# Patient Record
Sex: FEMALE | Race: BLACK OR AFRICAN AMERICAN
[De-identification: names, ages, dates, MRNs, and addresses within clinical notes are randomized per-mention and may not be internally consistent; named-entity substitution may affect disease eponyms.]

---

## 2021-11-24 VITALS — DIASTOLIC BLOOD PRESSURE: 67 MMHG | SYSTOLIC BLOOD PRESSURE: 150 MMHG

## 2021-12-16 ENCOUNTER — HOSPITAL ENCOUNTER (OUTPATIENT)
Dept: HOSPITAL 61 - KCIC CT | Age: 61
End: 2021-12-16
Attending: NURSE PRACTITIONER
Payer: COMMERCIAL

## 2021-12-16 DIAGNOSIS — J84.10: Primary | ICD-10-CM

## 2021-12-16 DIAGNOSIS — K76.89: ICD-10-CM

## 2021-12-16 DIAGNOSIS — D73.89: ICD-10-CM

## 2021-12-16 DIAGNOSIS — N20.0: ICD-10-CM

## 2021-12-16 DIAGNOSIS — K37: ICD-10-CM

## 2021-12-16 DIAGNOSIS — Z98.890: ICD-10-CM

## 2021-12-16 PROCEDURE — 74177 CT ABD & PELVIS W/CONTRAST: CPT

## 2021-12-16 RX ADMIN — IOHEXOL ONE ML: 240 INJECTION, SOLUTION INTRATHECAL; INTRAVASCULAR; INTRAVENOUS; ORAL at 14:23

## 2021-12-16 RX ADMIN — IOHEXOL ONE ML: 300 INJECTION, SOLUTION INTRAVENOUS at 14:23

## 2021-12-16 NOTE — [facility]
CT scan of the abdomen and pelvis with contrast  12/16/2021



CLINICAL HISTORY: Abdominal pain. Appendectomy on November 19 of this year.



TECHNIQUE: After the oral administration contrast and the intravenous administration of 100 cc of Omn
ipaque 300, contiguous, 5 mm axial sections were obtained through the abdomen and pelvis.



One or more of the following individualized dose reduction techniques were utilized for this study:



1. Automated exposure control.

2. Adjustment of the mA and/or kV according to patient size.

3. Use of iterative reconstruction technique.



FINDINGS: Comparison study is dated 11/19/2021.



Images through the lung bases demonstrate minimal dependent subsegmental atelectasis bilaterally. A 1
 cm calcified granuloma is again seen involving the left lower lobe.



The liver parenchyma has a decreased attenuation consistent with fatty infiltration. Calcified granul
omas are seen scattered throughout the spleen. The pancreas, adrenal glands and right kidney are with
in normal limits. Nonobstructing calculi are seen involving the left kidney which measure 1 to 2 mm i
n size.



The abdominal aorta tapers normally. The gallbladder is well-distended. No free fluid or free air is 
seen within the abdomen. There is no evidence of bowel obstruction. Surgical changes are seen consist
ent with an appendectomy. No abnormal fluid collection is seen to suggest evidence of an abscess. Air
 and stool are seen throughout the colon.



Images through the pelvis demonstrate the urinary bladder distended with urine. Calcifications are se
en within the pelvis consistent with phleboliths. The patient appears to be post hysterectomy. No adn
exal mass is seen. No free fluid is noted. The osseous structures are unchanged.



IMPRESSION: Post appendectomy. No acute abnormality is seen.



Electronically signed by: Bird Gambino MD (12/16/2021 2:40 PM) [id number]

## 2022-01-13 ENCOUNTER — HOSPITAL ENCOUNTER (OUTPATIENT)
Dept: HOSPITAL 61 - SURG | Age: 62
Discharge: HOME | End: 2022-01-13
Payer: COMMERCIAL

## 2022-01-13 VITALS
SYSTOLIC BLOOD PRESSURE: 145 MMHG | DIASTOLIC BLOOD PRESSURE: 74 MMHG | SYSTOLIC BLOOD PRESSURE: 145 MMHG | DIASTOLIC BLOOD PRESSURE: 74 MMHG

## 2022-01-13 VITALS — BODY MASS INDEX: 48.82 KG/M2 | HEIGHT: 65 IN | WEIGHT: 293 LBS

## 2022-01-13 VITALS — SYSTOLIC BLOOD PRESSURE: 173 MMHG | DIASTOLIC BLOOD PRESSURE: 93 MMHG

## 2022-01-13 DIAGNOSIS — I10: ICD-10-CM

## 2022-01-13 DIAGNOSIS — K21.00: ICD-10-CM

## 2022-01-13 DIAGNOSIS — Z88.8: ICD-10-CM

## 2022-01-13 DIAGNOSIS — K31.89: ICD-10-CM

## 2022-01-13 DIAGNOSIS — G47.30: ICD-10-CM

## 2022-01-13 DIAGNOSIS — F32.9: ICD-10-CM

## 2022-01-13 DIAGNOSIS — K59.00: Primary | ICD-10-CM

## 2022-01-13 DIAGNOSIS — R12: ICD-10-CM

## 2022-01-13 DIAGNOSIS — K64.0: ICD-10-CM

## 2022-01-13 DIAGNOSIS — K44.9: ICD-10-CM

## 2022-01-13 DIAGNOSIS — Z98.890: ICD-10-CM

## 2022-01-13 DIAGNOSIS — K63.89: ICD-10-CM

## 2022-01-13 DIAGNOSIS — Z79.899: ICD-10-CM

## 2022-01-13 DIAGNOSIS — M19.90: ICD-10-CM

## 2022-01-13 DIAGNOSIS — E66.3: ICD-10-CM

## 2022-01-13 PROCEDURE — 45378 DIAGNOSTIC COLONOSCOPY: CPT

## 2022-01-13 PROCEDURE — 43235 EGD DIAGNOSTIC BRUSH WASH: CPT

## 2022-01-13 NOTE — PDOC4
PROCEDURE


Procedure


EGD/colonoscopy





Indication: Heartburn, r/o Hernandes's/screening.





Meds: per anesthesia.





Findings:





E--healed reflux at 40cm, baseline grade indeterminate.


G--Normal


D--Normal.





BINA-normal.





--'Scope advanced to cecum.  Prep adequate.  Mucosa normal.  No diverticulosis, 

polyps, masses, etc.  Small IH's on retroflex.





Gray. well.





IMP: Healed GERD


        No Hernandes's.


        Internal hemorrhoids.





REC: Continue meds, diet as before.


         Reassure.


         Will have her f/u in 2 weeks to further discuss.


         Repeat colonoscopy in 10 years.











MARIFER PUCKETT MD          Jan 13, 2022 14:50

## 2022-03-16 ENCOUNTER — HOSPITAL ENCOUNTER (OUTPATIENT)
Dept: HOSPITAL 61 - US | Age: 62
End: 2022-03-16
Payer: COMMERCIAL

## 2022-03-16 DIAGNOSIS — K76.0: Primary | ICD-10-CM

## 2022-03-16 DIAGNOSIS — R10.13: ICD-10-CM

## 2022-03-16 PROCEDURE — 78227 HEPATOBIL SYST IMAGE W/DRUG: CPT

## 2022-03-16 PROCEDURE — 76700 US EXAM ABDOM COMPLETE: CPT

## 2022-03-16 PROCEDURE — A9537 TC99M MEBROFENIN: HCPCS

## 2022-03-16 NOTE — RAD
EXAM: Abdomen sonogram.



HISTORY: Epigastric pain.



TECHNIQUE: Sonographic imaging of the abdomen was performed.



COMPARISON: None.



FINDINGS: The liver is normal in size. There is hepatic steatosis. No focal hepatic lesion is seen. T
he gallbladder is unremarkable. The common bile duct is normal in caliber. The kidneys are normal in 
size. There is no solid or cystic renal lesion. There is no hydronephrosis. The pancreas, spleen and 
visualized portions of the aorta and inferior vena cava are unremarkable.



IMPRESSION:

1. Hepatic steatosis.

2. No acute sonographic finding.



Electronically signed by: Mariama Robbins MD (3/16/2022 9:19 AM) [id number]

## 2022-03-16 NOTE — RAD
EXAM: Nuclear hepatobiliary scan.



HISTORY: Pain.



TECHNIQUE: Following intravenous administration of 5.5 mCi Tc 99m Choletec, anterior images of the ab
domen were obtained at five minute intervals through one hour. Subsequently, CCK was administered and
 additional images to assess gallbladder ejection fraction were obtained.



FINDINGS: There is prompt radiotracer uptake by the liver. No focal defect is seen. There is normal e
xcretion into the biliary tree. The gallbladder is visualized within 5 minutes and there is free flow
 into the duodenum.  The gallbladder ejection fraction is 91 percent.



IMPRESSION: High gallbladder ejection fraction of 91 percent. This can be associated with biliary hyp
erkinesia.



Electronically signed by: Mariama Robbins MD (3/16/2022 10:48 AM) [id number]